# Patient Record
Sex: FEMALE | Race: WHITE
[De-identification: names, ages, dates, MRNs, and addresses within clinical notes are randomized per-mention and may not be internally consistent; named-entity substitution may affect disease eponyms.]

---

## 2020-02-27 ENCOUNTER — HOSPITAL ENCOUNTER (OUTPATIENT)
Dept: HOSPITAL 74 - JASU-SURG | Age: 45
Discharge: HOME | End: 2020-02-27
Attending: OBSTETRICS & GYNECOLOGY
Payer: COMMERCIAL

## 2020-02-27 VITALS — HEART RATE: 68 BPM | TEMPERATURE: 98 F | SYSTOLIC BLOOD PRESSURE: 127 MMHG | DIASTOLIC BLOOD PRESSURE: 74 MMHG

## 2020-02-27 VITALS — BODY MASS INDEX: 30.1 KG/M2

## 2020-02-27 DIAGNOSIS — D25.0: Primary | ICD-10-CM

## 2020-02-27 DIAGNOSIS — N84.1: ICD-10-CM

## 2020-02-27 DIAGNOSIS — N84.0: ICD-10-CM

## 2020-02-27 PROCEDURE — 0UB97ZX EXCISION OF UTERUS, VIA NATURAL OR ARTIFICIAL OPENING, DIAGNOSTIC: ICD-10-PCS | Performed by: OBSTETRICS & GYNECOLOGY

## 2020-02-27 PROCEDURE — 0UB98ZZ EXCISION OF UTERUS, VIA NATURAL OR ARTIFICIAL OPENING ENDOSCOPIC: ICD-10-PCS | Performed by: OBSTETRICS & GYNECOLOGY

## 2020-02-27 PROCEDURE — 0UDB8ZX EXTRACTION OF ENDOMETRIUM, VIA NATURAL OR ARTIFICIAL OPENING ENDOSCOPIC, DIAGNOSTIC: ICD-10-PCS | Performed by: OBSTETRICS & GYNECOLOGY

## 2020-02-27 PROCEDURE — 0UBC7ZX EXCISION OF CERVIX, VIA NATURAL OR ARTIFICIAL OPENING, DIAGNOSTIC: ICD-10-PCS | Performed by: OBSTETRICS & GYNECOLOGY

## 2020-02-27 NOTE — OP
DATE OF OPERATION:  02/27/2020

 

PREOPERATIVE DIAGNOSIS:  Endometrial polyp, cervical polyp.

 

OPERATION:  Hysteroscopic myomectomy, suction dilation and curettage.

 

POSTOPERATIVE DIAGNOSIS:  Endometrial polyp, cervical polyp, submucosal myoma.

 

SURGEON:  Dorcas Nguyen MD 

 

ASSISTANT:  None.

 

ANESTHESIA:  General.

 

DESCRIPTION OF PROCEDURE:  Patient was taken to the operating room, placed in dorsal

lithotomy position.  Prepped and draped in the usual sterile fashion.  Time-out was

performed in accordance with hospital regulation.  Speculum was placed in the vagina.

 Anterior lip of the cervix grasped with single-tooth tenaculum.  Cervix was then

dilated to accommodate the operative hysteroscope.  Hysteroscope was then inserted,

and visualization revealed cervical polyps and endometrial polyps, submucosal myoma. 

Cautery and cutting of all of the polyps and then suction dilation and curettage was

then performed.  Specimen was submitted to Pathology.  All instruments were then

removed.  There was a laceration on the cervix, which was closed using 2-0 Vicryl

suture.  Hemostasis was achieved, and patient tolerated procedure well.  Was taken to

recovery room in stable condition. 

 

 

DORCAS NGUYEN M.D.

 

[id number]

DD: 02/27/2020 12:46

DT: 02/27/2020 13:00

Job #:  [id number]

## 2020-02-27 NOTE — OP
Operative Note





- Note:


Operative Date: 02/27/20


Pre-Operative Diagnosis: Endometiral polyps


Operation: hysteroscopic myomectomy.  suction DC


Findings: 





cervical polyp


endometrial polyp 


submucosal myoma


Post-Operative Diagnosis: Same as Pre-op


Surgeon: Dorcas Nguyen


Anesthesia: General


Estimated Blood Loss (mls): 30


Operative Report Dictated: Yes

## 2020-02-28 NOTE — PATH
Surgical Pathology Report



Patient Name:  PAUL FRANKLIN

Accession #:  [id number]

[facility]. Rec. #:  [id number]                                                        

   /Age/Gender:  1975 (Age: 44) / F

Account:  [id number]                                                          

             Location: [facility] SURGICAL

Taken:  2020

Received:  2020

Reported:  2020

Physicians:  Dorcas Nguyen M.D.

  



Specimen(s) Received

A: ENDOCERVICAL CURETTINGS,  ENDOMETRIAL POLYP, ENDOCERVICAL POLYP 

B: UTERINE FIBROIDS 





Clinical History

Polyp/ovarian cyst







Final Diagnosis

A. ENDOMETRIAL CURETTINGS, ENDOMETRIAL POLYP, ENDOCERVICAL POLYP, SUCTION D&C:

FRAGMENTS OF ENDOCERVICAL AND ENDOMETRIAL POLYP.

SEPARATE PROLIFERATIVE ENDOMETRIUM.

SEPARATE SQUAMOUS EPITHELIUM WITH NO SIGNIFICANT PATHOLOGIC CHANGE.



B. UTERINE FIBROIDS, BIOPSY:

TWO FRAGMENTS OF ENDOMETRIAL POLYP.







***Electronically Signed***

Bhavin Mora M.D.





Gross Description

A. Received in formalin labeled "endometrial curettings, endometrial polyp,

endocervical polyp," is a 3.0 x 2.6 x 0.3 cm aggregate of tan-red soft tissue

fragments. The formalin is filtered and the specimen is entirely submitted in

one cassette.



B. Received in formalin labeled "fibroid uterus," is a less than 1 g aggregate

of 2 tan, firm tissue fragments measuring 0.3 and 0.7 cm in greatest dimension.

The specimens are submitted in toto in one cassette.





saudi